# Patient Record
Sex: FEMALE
[De-identification: names, ages, dates, MRNs, and addresses within clinical notes are randomized per-mention and may not be internally consistent; named-entity substitution may affect disease eponyms.]

---

## 2018-10-30 ENCOUNTER — RESULT REVIEW (OUTPATIENT)
Age: 63
End: 2018-10-30

## 2021-10-25 ENCOUNTER — APPOINTMENT (OUTPATIENT)
Dept: UROLOGY | Facility: CLINIC | Age: 66
End: 2021-10-25

## 2021-10-25 PROBLEM — Z00.00 ENCOUNTER FOR PREVENTIVE HEALTH EXAMINATION: Status: ACTIVE | Noted: 2021-10-25

## 2021-11-03 ENCOUNTER — APPOINTMENT (OUTPATIENT)
Dept: UROLOGY | Facility: CLINIC | Age: 66
End: 2021-11-03
Payer: MEDICARE

## 2021-11-03 VITALS
WEIGHT: 169 LBS | HEIGHT: 66 IN | HEART RATE: 60 BPM | DIASTOLIC BLOOD PRESSURE: 72 MMHG | RESPIRATION RATE: 134 BRPM | BODY MASS INDEX: 27.16 KG/M2 | TEMPERATURE: 97.5 F | OXYGEN SATURATION: 97 % | SYSTOLIC BLOOD PRESSURE: 162 MMHG

## 2021-11-03 DIAGNOSIS — N39.0 URINARY TRACT INFECTION, SITE NOT SPECIFIED: ICD-10-CM

## 2021-11-03 DIAGNOSIS — K21.9 GASTRO-ESOPHAGEAL REFLUX DISEASE W/OUT ESOPHAGITIS: ICD-10-CM

## 2021-11-03 DIAGNOSIS — Z78.9 OTHER SPECIFIED HEALTH STATUS: ICD-10-CM

## 2021-11-03 DIAGNOSIS — R35.0 FREQUENCY OF MICTURITION: ICD-10-CM

## 2021-11-03 DIAGNOSIS — R00.2 PALPITATIONS: ICD-10-CM

## 2021-11-03 DIAGNOSIS — N28.1 CYST OF KIDNEY, ACQUIRED: ICD-10-CM

## 2021-11-03 DIAGNOSIS — Z63.5 DISRUPTION OF FAMILY BY SEPARATION AND DIVORCE: ICD-10-CM

## 2021-11-03 DIAGNOSIS — Z86.79 PERSONAL HISTORY OF OTHER DISEASES OF THE CIRCULATORY SYSTEM: ICD-10-CM

## 2021-11-03 PROCEDURE — 99204 OFFICE O/P NEW MOD 45 MIN: CPT

## 2021-11-03 RX ORDER — IRBESARTAN 300 MG/1
TABLET ORAL
Refills: 0 | Status: ACTIVE | COMMUNITY

## 2021-11-03 SDOH — SOCIAL STABILITY - SOCIAL INSECURITY: DISRUPTION OF FAMILY BY SEPARATION AND DIVORCE: Z63.5

## 2021-11-03 NOTE — LETTER BODY
[Dear  ___] : Dear  [unfilled], [Consult Letter:] : I had the pleasure of evaluating your patient, [unfilled]. [Consult Closing:] : Thank you very much for allowing me to participate in the care of this patient.  If you have any questions, please do not hesitate to contact me. [FreeTextEntry1] : [facility]\par [address] \par [place], [place], [address]  \par [phone]

## 2021-11-03 NOTE — HISTORY OF PRESENT ILLNESS
[FreeTextEntry1] : She is a 66-year-old woman who is seen today for initial visit.  She was told recently to have a urinary tract infection and given Cipro.  Sometimes she has dysuria and urinary frequency and urgency.  Nocturia is 2-3 times but she drinks fluids throughout the night.  There is no hematuria or flank pain.  There is no fever.  More than 10 years ago she underwent hysterectomy for benign disease.  Creatinine was 0.94.  In August 2021, urinalysis showed white blood cells and she was given Cipro.  In October 2021, urinalysis showed white blood cells and urine culture was negative.  Ultrasound in August 2021 showed that the left kidney was smaller with a 2.3 cm renal cyst.  Residual urine volume was minimal.

## 2021-11-03 NOTE — ASSESSMENT
[FreeTextEntry1] : Renal ultrasound report was discussed with her.  No further intervention is needed for renal cyst and smaller left kidney.  Her renal function was normal.  The significance of white blood cells in the urine was discussed.  She may have had a urinary tract infection but no culture was sent initially.  It does not appear that she was very symptomatic however.  Also she was made aware of significant side effects of Cipro which is an antibiotic which should be used as a last resort.  She can use over-the-counter Replens cream for vaginal dryness until she sees her gynecologist in follow-up in a few months.  She can follow-up in the future if needed.\par \par Beck Wu MD, FACS\par [facility]\par  of Urology\par \par [address], [address]\[place] [address]\par \par [address], [address]\[place] [address]\par \par Tel: [phone]\par Fax: [phone]

## 2021-11-03 NOTE — REVIEW OF SYSTEMS
[Recent Weight Gain (___ Lbs)] : recent [unfilled] ~Ulb weight gain [Palpitations] : palpitations [Heartburn] : heartburn [Genital yeast infection] : genital yeast infection [Urine Infection (bladder/kidney)] : bladder/kidney infection [Wake up at night to urinate  How many times?  ___] : wakes up to urinate [unfilled] times during the night [Easy Bruising] : a tendency for easy bruising [Negative] : Endocrine [FreeTextEntry3] : sinus problems [FreeTextEntry2] : hypertension [FreeTextEntry6] : frequency

## 2022-10-11 ENCOUNTER — APPOINTMENT (OUTPATIENT)
Dept: ORTHOPEDIC SURGERY | Facility: CLINIC | Age: 67
End: 2022-10-11

## 2022-10-11 VITALS — WEIGHT: 169 LBS | HEIGHT: 66 IN | BODY MASS INDEX: 27.16 KG/M2

## 2022-10-11 DIAGNOSIS — M65.342 TRIGGER FINGER, LEFT RING FINGER: ICD-10-CM

## 2022-10-11 PROCEDURE — 20550 NJX 1 TENDON SHEATH/LIGAMENT: CPT

## 2022-10-11 PROCEDURE — 99213 OFFICE O/P EST LOW 20 MIN: CPT | Mod: 25

## 2022-10-11 PROCEDURE — J3490M: CUSTOM

## 2022-10-11 NOTE — HISTORY OF PRESENT ILLNESS
[6] : 6 [5] : 5 [Dull/Aching] : dull/aching [Ice] : ice [de-identified] : L RF trigger\par Injection 2 years ago helped until now [] : no [FreeTextEntry1] : L RF [FreeTextEntry3] : SEPT 2022 [FreeTextEntry5] : clicking\par no injury [de-identified] : 2020 [de-identified] : Dr. Watters

## 2022-10-11 NOTE — PHYSICAL EXAM
[de-identified] : L hand: \par Mild swelling \par Tender 4th A1 pulley \par Decreased ring ROM \par +ring triggering\par

## 2024-01-10 ENCOUNTER — APPOINTMENT (OUTPATIENT)
Dept: NEUROSURGERY | Facility: CLINIC | Age: 69
End: 2024-01-10
Payer: MEDICARE

## 2024-01-10 ENCOUNTER — NON-APPOINTMENT (OUTPATIENT)
Age: 69
End: 2024-01-10

## 2024-01-10 VITALS
HEART RATE: 62 BPM | OXYGEN SATURATION: 96 % | TEMPERATURE: 98.5 F | SYSTOLIC BLOOD PRESSURE: 148 MMHG | WEIGHT: 164 LBS | HEIGHT: 66 IN | DIASTOLIC BLOOD PRESSURE: 84 MMHG | BODY MASS INDEX: 26.36 KG/M2

## 2024-01-10 DIAGNOSIS — I67.1 CEREBRAL ANEURYSM, NONRUPTURED: ICD-10-CM

## 2024-01-10 PROCEDURE — 99203 OFFICE O/P NEW LOW 30 MIN: CPT

## 2024-01-10 RX ORDER — ESOMEPRAZOLE MAGNESIUM 40 MG/1
CAPSULE, DELAYED RELEASE ORAL
Refills: 0 | Status: DISCONTINUED | COMMUNITY
End: 2024-01-10

## 2024-01-10 RX ORDER — OMEPRAZOLE 20 MG/1
20 CAPSULE, DELAYED RELEASE ORAL
Refills: 0 | Status: ACTIVE | COMMUNITY

## 2024-01-10 NOTE — CONSULT LETTER
[Dear  ___] : Dear  [unfilled], [( Thank you for referring [unfilled] for consultation for _____ )] : Thank you for referring [unfilled] for consultation for [unfilled] [Please see my note below.] : Please see my note below. [Consult Closing:] : Thank you very much for allowing me to participate in the care of this patient.  If you have any questions, please do not hesitate to contact me. [Sincerely,] : Sincerely, [FreeTextEntry3] : Delvin Dalton MD Email: [email or web address] Cell: [phone]

## 2024-01-10 NOTE — REVIEW OF SYSTEMS
[As Noted in HPI] : as noted in HPI [Negative] : Heme/Lymph [Dizziness] : dizziness [de-identified] : headaches

## 2024-01-10 NOTE — HISTORY OF PRESENT ILLNESS
[de-identified] : KAILA JALLOH is a 68 year old female with PMH of HTN, GERD, who presented to neurologist, Dr. Azeem Faustin for dizziness in 2020 and was diagnosed with right cavernous ICA aneurysm which is being monitored. Recent work-up with MRA brain non con 12/22/23 at [facility] showed 3 x 3mm right cavernous ICA aneurysm, and 2mm left PComm aneurysm vs. infundibulum. Referred here for neurosurgical evaluation. Denies known family history of cerebral aneurysms or aneurysm rupture. Never a smoker.

## 2024-01-10 NOTE — ASSESSMENT
[FreeTextEntry1] : IMPRESSION: 68F with PMH of HTN, p/w dizziness, diagnosed with R cavernous aneurysm in 2020. Recent work-up with MRA brain 12/22/23 [facility] with incidental 3 x 3mm right cavernous ICA aneurysm, and 2mm left PComm aneurysm vs. infundibulum. No family history of cerebral aneurysms or smoking history.  The right ICA aneurysm appears to originate right at the dural ring, just proximal to the take off of the ophthalmic. Difficult to say whether this is intradural or not, currently being read as cavernous but previously read as supraclinoid. Regardless, since it is very small management will be the same. The left ICA aneurysm is likely an infundibulum, but also impossible to say with certainty without angiography.   Counseled patient on the natural history of aneurysms. Due to the small size of the R ICA aneurysm and possible location in the cavernous segment, and the very small L Pcomm possible aneurysm, it has very low risk of rupturing, less than 1% per year. Educated the patient on signs and symptoms of aneurysm rupture or subarachnoid hemorrhage. Should she have severe, sudden onset worst headache of her life, advised that she must seek medical attention immediately and go to the emergency room if this occurs.   Recommend conservative management with serial scans for both aneurysm findings. If growth of the aneurysms is seen on interval scans, will consider aneurysm treatment options.    PLAN: Given she has annual follow up, would recommend repeating MRA head non con in 1 year from previous imaging Plan to fax office note and plan to Dr. Azeem Faustin's  [facility] office

## 2024-04-17 NOTE — PHYSICAL EXAM
[facility] at bedside preparing to transport patient to [facility]. Patient stable, denies pain at this time. Tele box and LAUREN IV removed. Belongings with patient. Per patient, dtr Carmita has wallet and keys with her.     Report given @ 1213 to [facility]   [General Appearance - Well Developed] : well developed [General Appearance - Well Nourished] : well nourished [Normal Appearance] : normal appearance [Well Groomed] : well groomed [General Appearance - In No Acute Distress] : no acute distress [Edema] : no peripheral edema [Respiration, Rhythm And Depth] : normal respiratory rhythm and effort [Exaggerated Use Of Accessory Muscles For Inspiration] : no accessory muscle use [Abdomen Soft] : soft [Abdomen Tenderness] : non-tender [Costovertebral Angle Tenderness] : no ~M costovertebral angle tenderness [Urinary Bladder Findings] : the bladder was normal on palpation [Normal Station and Gait] : the gait and station were normal for the patient's age [] : no rash [No Focal Deficits] : no focal deficits [Oriented To Time, Place, And Person] : oriented to person, place, and time [Affect] : the affect was normal [Mood] : the mood was normal [Not Anxious] : not anxious [Inguinal Lymph Nodes Enlarged Bilaterally] : inguinal [FreeTextEntry1] : MA in room, vaginal mucosal dryness.  No prolapse

## 2025-07-15 ENCOUNTER — APPOINTMENT (OUTPATIENT)
Dept: ORTHOPEDIC SURGERY | Facility: CLINIC | Age: 70
End: 2025-07-15
Payer: MEDICARE

## 2025-07-15 PROBLEM — Z78.9 NO PERTINENT PAST MEDICAL HISTORY: Status: RESOLVED | Noted: 2025-07-15 | Resolved: 2025-07-15

## 2025-07-15 PROCEDURE — 99213 OFFICE O/P EST LOW 20 MIN: CPT | Mod: 25

## 2025-07-15 PROCEDURE — 20550 NJX 1 TENDON SHEATH/LIGAMENT: CPT | Mod: F7

## 2025-07-15 RX ORDER — THIAMINE HCL 100 MG
CAPSULE ORAL
Refills: 0 | Status: ACTIVE | COMMUNITY

## 2025-08-12 ENCOUNTER — APPOINTMENT (OUTPATIENT)
Dept: ORTHOPEDIC SURGERY | Facility: CLINIC | Age: 70
End: 2025-08-12
Payer: MEDICARE

## 2025-08-12 DIAGNOSIS — M65.331 TRIGGER FINGER, RIGHT MIDDLE FINGER: ICD-10-CM

## 2025-08-12 PROCEDURE — 20550 NJX 1 TENDON SHEATH/LIGAMENT: CPT | Mod: F7

## 2025-08-12 PROCEDURE — 99213 OFFICE O/P EST LOW 20 MIN: CPT | Mod: 25
